# Patient Record
Sex: FEMALE | URBAN - METROPOLITAN AREA
[De-identification: names, ages, dates, MRNs, and addresses within clinical notes are randomized per-mention and may not be internally consistent; named-entity substitution may affect disease eponyms.]

---

## 2024-09-11 ENCOUNTER — APPOINTMENT (RX ONLY)
Dept: URBAN - METROPOLITAN AREA CLINIC 162 | Facility: CLINIC | Age: 64
Setting detail: DERMATOLOGY
End: 2024-09-11

## 2024-09-11 DIAGNOSIS — Z41.9 ENCOUNTER FOR PROCEDURE FOR PURPOSES OTHER THAN REMEDYING HEALTH STATE, UNSPECIFIED: ICD-10-CM

## 2024-09-11 PROCEDURE — ? COSMETIC QUOTE

## 2024-09-11 PROCEDURE — ? COSMETIC CONSULTATION: CHEMICAL PEELS

## 2024-09-11 PROCEDURE — ? COSMETIC CONSULTATION: HYDRAFACIAL

## 2024-09-11 PROCEDURE — ? COSMETIC CONSULTATION: LASER RESURFACING

## 2024-09-11 PROCEDURE — ? ADDITIONAL NOTES

## 2024-09-11 ASSESSMENT — LOCATION DETAILED DESCRIPTION DERM
LOCATION DETAILED: RIGHT SUPERIOR MEDIAL FOREHEAD
LOCATION DETAILED: RIGHT FOREHEAD
LOCATION DETAILED: INFERIOR MID FOREHEAD

## 2024-09-11 ASSESSMENT — LOCATION ZONE DERM: LOCATION ZONE: FACE

## 2024-09-11 ASSESSMENT — LOCATION SIMPLE DESCRIPTION DERM
LOCATION SIMPLE: RIGHT FOREHEAD
LOCATION SIMPLE: INFERIOR FOREHEAD

## 2024-09-11 NOTE — PROCEDURE: COSMETIC QUOTE
Laser 5 Free Text Discount (In Dollars- Use Only Numbers And Decimals): 0.00
Implant 9 Units: 0
Body Procedure 2: arms and hands
Use Name For Above Discounts: No
Face Procedure 3: Vi Peel original
Face Procedure 7 Price/Unit (In Dollars- Use Only Numbers And Decimals): 265.09
Face Procedure 4 Price/Unit (In Dollars- Use Only Numbers And Decimals): 349
Anesthesia Fee Units (Optional): 1
Face Procedure 1 Units: 4
Send Charges To Patient Encounter: Yes
Face Procedure 5 Price/Unit (In Dollars- Use Only Numbers And Decimals): 329.00
Face Procedure 1 Percentage Discount: 15
Body Procedure 1 Price/Unit (In Dollars- Use Only Numbers And Decimals): 800.00
Face Procedure 2 Price/Unit (In Dollars- Use Only Numbers And Decimals): 450.00
Face Procedure 6: Vi Peel
Face Procedure 6 Price/Unit (In Dollars- Use Only Numbers And Decimals): 349.00
Body Procedure 2 Price/Unit (In Dollars- Use Only Numbers And Decimals): 400.00
Face Procedure 7: Deluxe Hydrafacial
Face Procedure 1: Venus viva
Detail Level: Zone
Face Procedure 5: Skin Pen Microneedling
Body Procedure 1: IPL arms